# Patient Record
Sex: MALE | Race: WHITE | NOT HISPANIC OR LATINO | Employment: STUDENT | URBAN - METROPOLITAN AREA
[De-identification: names, ages, dates, MRNs, and addresses within clinical notes are randomized per-mention and may not be internally consistent; named-entity substitution may affect disease eponyms.]

---

## 2023-11-19 ENCOUNTER — HOSPITAL ENCOUNTER (EMERGENCY)
Facility: HOSPITAL | Age: 18
Discharge: HOME | End: 2023-11-20
Attending: STUDENT IN AN ORGANIZED HEALTH CARE EDUCATION/TRAINING PROGRAM
Payer: COMMERCIAL

## 2023-11-19 ENCOUNTER — APPOINTMENT (OUTPATIENT)
Dept: RADIOLOGY | Facility: HOSPITAL | Age: 18
End: 2023-11-19
Payer: COMMERCIAL

## 2023-11-19 DIAGNOSIS — R10.84 GENERALIZED ABDOMINAL PAIN: ICD-10-CM

## 2023-11-19 DIAGNOSIS — R11.2 NAUSEA AND VOMITING, UNSPECIFIED VOMITING TYPE: Primary | ICD-10-CM

## 2023-11-19 LAB
ALBUMIN SERPL BCP-MCNC: 5 G/DL (ref 3.4–5)
ALP SERPL-CCNC: 55 U/L (ref 33–120)
ALT SERPL W P-5'-P-CCNC: 60 U/L (ref 10–52)
ANION GAP SERPL CALC-SCNC: 19 MMOL/L (ref 10–20)
APPEARANCE UR: CLEAR
AST SERPL W P-5'-P-CCNC: 24 U/L (ref 9–39)
BASOPHILS # BLD AUTO: 0.04 X10*3/UL (ref 0–0.1)
BASOPHILS NFR BLD AUTO: 0.4 %
BILIRUB SERPL-MCNC: 1.2 MG/DL (ref 0–1.2)
BILIRUB UR STRIP.AUTO-MCNC: NEGATIVE MG/DL
BUN SERPL-MCNC: 11 MG/DL (ref 6–23)
CALCIUM SERPL-MCNC: 10.3 MG/DL (ref 8.6–10.3)
CHLORIDE SERPL-SCNC: 102 MMOL/L (ref 98–107)
CO2 SERPL-SCNC: 20 MMOL/L (ref 21–32)
COLOR UR: YELLOW
CREAT SERPL-MCNC: 0.8 MG/DL (ref 0.5–1.3)
EOSINOPHIL # BLD AUTO: 0.03 X10*3/UL (ref 0–0.7)
EOSINOPHIL NFR BLD AUTO: 0.3 %
ERYTHROCYTE [DISTWIDTH] IN BLOOD BY AUTOMATED COUNT: 12.2 % (ref 11.5–14.5)
GFR SERPL CREATININE-BSD FRML MDRD: >90 ML/MIN/1.73M*2
GLUCOSE SERPL-MCNC: 140 MG/DL (ref 74–99)
GLUCOSE UR STRIP.AUTO-MCNC: NEGATIVE MG/DL
HCT VFR BLD AUTO: 52.4 % (ref 41–52)
HGB BLD-MCNC: 18.4 G/DL (ref 13.5–17.5)
HOLD SPECIMEN: NORMAL
IMM GRANULOCYTES # BLD AUTO: 0.05 X10*3/UL (ref 0–0.7)
IMM GRANULOCYTES NFR BLD AUTO: 0.5 % (ref 0–0.9)
KETONES UR STRIP.AUTO-MCNC: ABNORMAL MG/DL
LEUKOCYTE ESTERASE UR QL STRIP.AUTO: NEGATIVE
LIPASE SERPL-CCNC: 16 U/L (ref 9–82)
LYMPHOCYTES # BLD AUTO: 1.48 X10*3/UL (ref 1.2–4.8)
LYMPHOCYTES NFR BLD AUTO: 14.8 %
MCH RBC QN AUTO: 28.4 PG (ref 26–34)
MCHC RBC AUTO-ENTMCNC: 35.1 G/DL (ref 32–36)
MCV RBC AUTO: 81 FL (ref 80–100)
MONOCYTES # BLD AUTO: 0.5 X10*3/UL (ref 0.1–1)
MONOCYTES NFR BLD AUTO: 5 %
MUCOUS THREADS #/AREA URNS AUTO: NORMAL /LPF
NEUTROPHILS # BLD AUTO: 7.87 X10*3/UL (ref 1.2–7.7)
NEUTROPHILS NFR BLD AUTO: 79 %
NITRITE UR QL STRIP.AUTO: NEGATIVE
NRBC BLD-RTO: 0 /100 WBCS (ref 0–0)
PH UR STRIP.AUTO: 7 [PH]
PLATELET # BLD AUTO: 293 X10*3/UL (ref 150–450)
POTASSIUM SERPL-SCNC: 3.8 MMOL/L (ref 3.5–5.3)
PROT SERPL-MCNC: 7.6 G/DL (ref 6.4–8.2)
PROT UR STRIP.AUTO-MCNC: ABNORMAL MG/DL
RBC # BLD AUTO: 6.47 X10*6/UL (ref 4.5–5.9)
RBC # UR STRIP.AUTO: NEGATIVE /UL
RBC #/AREA URNS AUTO: NORMAL /HPF
SODIUM SERPL-SCNC: 137 MMOL/L (ref 136–145)
SP GR UR STRIP.AUTO: 1.02
UROBILINOGEN UR STRIP.AUTO-MCNC: 2 MG/DL
WBC # BLD AUTO: 10 X10*3/UL (ref 4.4–11.3)
WBC #/AREA URNS AUTO: NORMAL /HPF

## 2023-11-19 PROCEDURE — 2500000004 HC RX 250 GENERAL PHARMACY W/ HCPCS (ALT 636 FOR OP/ED): Performed by: PHYSICIAN ASSISTANT

## 2023-11-19 PROCEDURE — 96361 HYDRATE IV INFUSION ADD-ON: CPT

## 2023-11-19 PROCEDURE — 2500000001 HC RX 250 WO HCPCS SELF ADMINISTERED DRUGS (ALT 637 FOR MEDICARE OP): Performed by: PHYSICIAN ASSISTANT

## 2023-11-19 PROCEDURE — 99284 EMERGENCY DEPT VISIT MOD MDM: CPT | Mod: 25

## 2023-11-19 PROCEDURE — 80053 COMPREHEN METABOLIC PANEL: CPT | Performed by: PHYSICIAN ASSISTANT

## 2023-11-19 PROCEDURE — 36415 COLL VENOUS BLD VENIPUNCTURE: CPT | Performed by: PHYSICIAN ASSISTANT

## 2023-11-19 PROCEDURE — 74177 CT ABD & PELVIS W/CONTRAST: CPT | Mod: FR

## 2023-11-19 PROCEDURE — 96375 TX/PRO/DX INJ NEW DRUG ADDON: CPT | Mod: 59

## 2023-11-19 PROCEDURE — 2550000001 HC RX 255 CONTRASTS: Performed by: PHYSICIAN ASSISTANT

## 2023-11-19 PROCEDURE — 85025 COMPLETE CBC W/AUTO DIFF WBC: CPT | Performed by: PHYSICIAN ASSISTANT

## 2023-11-19 PROCEDURE — 96365 THER/PROPH/DIAG IV INF INIT: CPT | Mod: 59

## 2023-11-19 PROCEDURE — 96376 TX/PRO/DX INJ SAME DRUG ADON: CPT | Mod: 59

## 2023-11-19 PROCEDURE — 74177 CT ABD & PELVIS W/CONTRAST: CPT | Mod: FOREIGN READ | Performed by: RADIOLOGY

## 2023-11-19 PROCEDURE — 81001 URINALYSIS AUTO W/SCOPE: CPT | Performed by: PHYSICIAN ASSISTANT

## 2023-11-19 PROCEDURE — 99285 EMERGENCY DEPT VISIT HI MDM: CPT | Mod: 25 | Performed by: STUDENT IN AN ORGANIZED HEALTH CARE EDUCATION/TRAINING PROGRAM

## 2023-11-19 PROCEDURE — 83690 ASSAY OF LIPASE: CPT | Performed by: PHYSICIAN ASSISTANT

## 2023-11-19 RX ORDER — ONDANSETRON HYDROCHLORIDE 2 MG/ML
4 INJECTION, SOLUTION INTRAVENOUS ONCE
Status: COMPLETED | OUTPATIENT
Start: 2023-11-19 | End: 2023-11-19

## 2023-11-19 RX ORDER — FAMOTIDINE 20 MG/1
40 TABLET, FILM COATED ORAL ONCE
Status: COMPLETED | OUTPATIENT
Start: 2023-11-19 | End: 2023-11-19

## 2023-11-19 RX ADMIN — IOHEXOL 75 ML: 350 INJECTION, SOLUTION INTRAVENOUS at 22:46

## 2023-11-19 RX ADMIN — ONDANSETRON 4 MG: 2 INJECTION INTRAMUSCULAR; INTRAVENOUS at 20:04

## 2023-11-19 RX ADMIN — ONDANSETRON 4 MG: 2 INJECTION INTRAMUSCULAR; INTRAVENOUS at 22:53

## 2023-11-19 RX ADMIN — FAMOTIDINE 40 MG: 20 TABLET ORAL at 20:52

## 2023-11-19 RX ADMIN — SODIUM CHLORIDE 1000 ML: 9 INJECTION, SOLUTION INTRAVENOUS at 22:44

## 2023-11-19 RX ADMIN — SODIUM CHLORIDE 1000 ML: 9 INJECTION, SOLUTION INTRAVENOUS at 19:59

## 2023-11-19 ASSESSMENT — PAIN SCALES - GENERAL
PAINLEVEL_OUTOF10: 0 - NO PAIN

## 2023-11-19 ASSESSMENT — COLUMBIA-SUICIDE SEVERITY RATING SCALE - C-SSRS
1. IN THE PAST MONTH, HAVE YOU WISHED YOU WERE DEAD OR WISHED YOU COULD GO TO SLEEP AND NOT WAKE UP?: NO
2. HAVE YOU ACTUALLY HAD ANY THOUGHTS OF KILLING YOURSELF?: NO
6. HAVE YOU EVER DONE ANYTHING, STARTED TO DO ANYTHING, OR PREPARED TO DO ANYTHING TO END YOUR LIFE?: NO

## 2023-11-19 ASSESSMENT — LIFESTYLE VARIABLES
EVER HAD A DRINK FIRST THING IN THE MORNING TO STEADY YOUR NERVES TO GET RID OF A HANGOVER: NO
HAVE YOU EVER FELT YOU SHOULD CUT DOWN ON YOUR DRINKING: NO
EVER FELT BAD OR GUILTY ABOUT YOUR DRINKING: NO
REASON UNABLE TO ASSESS: YES
HAVE PEOPLE ANNOYED YOU BY CRITICIZING YOUR DRINKING: NO

## 2023-11-19 ASSESSMENT — PAIN - FUNCTIONAL ASSESSMENT: PAIN_FUNCTIONAL_ASSESSMENT: 0-10

## 2023-11-20 VITALS
SYSTOLIC BLOOD PRESSURE: 142 MMHG | HEART RATE: 60 BPM | TEMPERATURE: 97 F | RESPIRATION RATE: 18 BRPM | DIASTOLIC BLOOD PRESSURE: 70 MMHG | HEIGHT: 76 IN | WEIGHT: 315 LBS | BODY MASS INDEX: 38.36 KG/M2 | OXYGEN SATURATION: 98 %

## 2023-11-20 PROCEDURE — 2500000004 HC RX 250 GENERAL PHARMACY W/ HCPCS (ALT 636 FOR OP/ED): Performed by: PHYSICIAN ASSISTANT

## 2023-11-20 RX ORDER — FAMOTIDINE 40 MG/1
40 TABLET, FILM COATED ORAL NIGHTLY PRN
Qty: 20 TABLET | Refills: 0 | Status: SHIPPED | OUTPATIENT
Start: 2023-11-20 | End: 2024-04-16

## 2023-11-20 RX ORDER — NAPROXEN 500 MG/1
500 TABLET ORAL
Qty: 30 TABLET | Refills: 0 | Status: SHIPPED | OUTPATIENT
Start: 2023-11-20 | End: 2023-12-05

## 2023-11-20 RX ORDER — ONDANSETRON 4 MG/1
4 TABLET, ORALLY DISINTEGRATING ORAL EVERY 8 HOURS PRN
Qty: 20 TABLET | Refills: 0 | Status: SHIPPED | OUTPATIENT
Start: 2023-11-20 | End: 2023-11-27

## 2023-11-20 RX ADMIN — PROMETHAZINE HYDROCHLORIDE 12.5 MG: 25 INJECTION INTRAMUSCULAR; INTRAVENOUS at 01:10

## 2023-11-20 ASSESSMENT — PAIN SCALES - GENERAL: PAINLEVEL_OUTOF10: 0 - NO PAIN

## 2023-11-20 NOTE — ED PROVIDER NOTES
HPI   Chief Complaint   Patient presents with    Nausea     Nausea , vomiting, started this afternoon.         18-year-old male presents today complaining of abdominal pain nausea and vomiting.  The patient states that he initially began experiencing nausea which proceeded into vomiting and abdominal discomfort which began this morning.  He reports a generalized abdominal discomfort.  Denies diarrhea or constipation.  No reports of fevers.  Denies any past history of abdominal surgery.  No reports of sick contacts with similar symptoms.  Denies any hematemesis.                          Kewadin Coma Scale Score: 15                  Patient History   History reviewed. No pertinent past medical history.  History reviewed. No pertinent surgical history.  No family history on file.  Social History     Tobacco Use    Smoking status: Not on file    Smokeless tobacco: Not on file   Substance Use Topics    Alcohol use: Not on file    Drug use: Not on file       Physical Exam   ED Triage Vitals   Temp Heart Rate Resp BP   11/19/23 1934 11/19/23 1934 11/19/23 1934 11/19/23 1934   35.2 °C (95.4 °F) 76 24 150/87      SpO2 Temp Source Heart Rate Source Patient Position   11/19/23 1934 11/19/23 2008 -- --   98 % Temporal        BP Location FiO2 (%)     -- --             Physical Exam  Vitals and nursing note reviewed.   Constitutional:       General: He is not in acute distress.     Appearance: Normal appearance. He is normal weight. He is not ill-appearing, toxic-appearing or diaphoretic.   HENT:      Head: Normocephalic.      Nose: Nose normal.      Mouth/Throat:      Mouth: Mucous membranes are moist.   Eyes:      Extraocular Movements: Extraocular movements intact.      Conjunctiva/sclera: Conjunctivae normal.   Cardiovascular:      Rate and Rhythm: Normal rate and regular rhythm.      Pulses: Normal pulses.   Pulmonary:      Effort: Pulmonary effort is normal. No respiratory distress.      Breath sounds: Normal breath sounds.    Abdominal:      General: Abdomen is flat. Bowel sounds are normal. There is no distension.      Palpations: Abdomen is soft.      Tenderness: There is no abdominal tenderness. There is no guarding or rebound.   Musculoskeletal:         General: Normal range of motion.      Cervical back: Normal range of motion and neck supple.   Skin:     General: Skin is warm and dry.      Capillary Refill: Capillary refill takes less than 2 seconds.   Neurological:      General: No focal deficit present.      Mental Status: He is alert and oriented to person, place, and time.   Psychiatric:         Mood and Affect: Mood normal.         Behavior: Behavior normal.         Thought Content: Thought content normal.         Judgment: Judgment normal.         ED Course & MDM   ED Course as of 11/20/23 0046   Sun Nov 19, 2023 2038 LIPASE: 16 [DS]   2038 ALT(!): 60 [DS]   2038 AST: 24 [DS]   2038 Creatinine: 0.80 [DS]   2038 POTASSIUM: 3.8 [DS]   2038 SODIUM: 137 [DS]   2039 GLUCOSE(!): 140 [DS]   2039 WBC: 10.0 [DS]   Mon Nov 20, 2023 0025 IMPRESSION:  No acute inflammatory changes in the abdomen and pelvis.   [DS]      ED Course User Index  [DS] Tien Stone PA-C         Diagnoses as of 11/20/23 0046   Nausea and vomiting, unspecified vomiting type   Generalized abdominal pain       Medical Decision Making  Patient found to be well-appearing and hemodynamically stable on arrival.  He is nontoxic in appearance.  He is afebrile.  Abdomen soft without overt tenderness.  Negative Burns sign.  Blood work was obtained revealing no evidence of leukocytosis.  Patient had a slight elevation in his ALT likely related to his body habitus.  Creatinine within normal limits.  No evidence of significant electrolyte derangement.  Lipase 16.  Patient was provided with 2 L of normal saline along with Pepcid and Zofran.  CT imaging was obtained and revealed no acute inflammatory changes in the abdomen or pelvis.  Patient family were notified.   Patient was given a p.o. fluid challenge without subsequent vomiting.  Explained differentials at length which include viral etiology.  Recommended close follow-up with his primary care physician.  Patient be discharged home with a prescription for Zofran ODT        Procedure  Procedures     Tien Stone PA-C  11/20/23 0047

## 2023-12-06 ENCOUNTER — OFFICE VISIT (OUTPATIENT)
Dept: GASTROENTEROLOGY | Facility: CLINIC | Age: 18
End: 2023-12-06
Payer: COMMERCIAL

## 2023-12-06 VITALS
OXYGEN SATURATION: 98 % | HEART RATE: 89 BPM | BODY MASS INDEX: 40.29 KG/M2 | RESPIRATION RATE: 18 BRPM | WEIGHT: 315 LBS | SYSTOLIC BLOOD PRESSURE: 123 MMHG | DIASTOLIC BLOOD PRESSURE: 82 MMHG | TEMPERATURE: 97.3 F

## 2023-12-06 DIAGNOSIS — K52.9 CHRONIC DIARRHEA: Primary | ICD-10-CM

## 2023-12-06 DIAGNOSIS — R12 HEARTBURN: ICD-10-CM

## 2023-12-06 DIAGNOSIS — K62.5 BRBPR (BRIGHT RED BLOOD PER RECTUM): ICD-10-CM

## 2023-12-06 DIAGNOSIS — R13.19 ESOPHAGEAL DYSPHAGIA: ICD-10-CM

## 2023-12-06 DIAGNOSIS — R10.9 LEFT SIDED ABDOMINAL PAIN: ICD-10-CM

## 2023-12-06 DIAGNOSIS — R14.0 ABDOMINAL BLOATING: ICD-10-CM

## 2023-12-06 DIAGNOSIS — R11.2 NAUSEA AND VOMITING, UNSPECIFIED VOMITING TYPE: ICD-10-CM

## 2023-12-06 DIAGNOSIS — R68.81 EARLY SATIETY: ICD-10-CM

## 2023-12-06 PROCEDURE — 99204 OFFICE O/P NEW MOD 45 MIN: CPT | Performed by: NURSE PRACTITIONER

## 2023-12-06 RX ORDER — HYDROXYZINE HYDROCHLORIDE 25 MG/1
TABLET, FILM COATED ORAL AS NEEDED
COMMUNITY

## 2023-12-06 RX ORDER — POLYETHYLENE GLYCOL 3350 17 G/17G
238 POWDER, FOR SOLUTION ORAL ONCE
Qty: 238 G | Refills: 0 | Status: SHIPPED | OUTPATIENT
Start: 2023-12-06 | End: 2023-12-06

## 2023-12-06 RX ORDER — SERTRALINE HYDROCHLORIDE 25 MG/1
25 TABLET, FILM COATED ORAL DAILY
COMMUNITY

## 2023-12-06 RX ORDER — TRAZODONE HYDROCHLORIDE 50 MG/1
50 TABLET ORAL NIGHTLY PRN
COMMUNITY
End: 2024-04-16

## 2023-12-06 ASSESSMENT — ENCOUNTER SYMPTOMS
WEAKNESS: 0
FLANK PAIN: 0
PHOTOPHOBIA: 0
HALLUCINATIONS: 0
ARTHRALGIAS: 0
HEADACHES: 0
WHEEZING: 0
BACK PAIN: 0
AGITATION: 0
FREQUENCY: 0
NERVOUS/ANXIOUS: 0
MYALGIAS: 0
COUGH: 0
LIGHT-HEADEDNESS: 0
DIZZINESS: 0
CHILLS: 0
FEVER: 0
EYE PAIN: 0
NUMBNESS: 0
ADENOPATHY: 0
SHORTNESS OF BREATH: 0
JOINT SWELLING: 0
DYSURIA: 0
PALPITATIONS: 0
HEMATURIA: 0
SORE THROAT: 0
DIAPHORESIS: 0
FATIGUE: 0

## 2023-12-06 NOTE — PROGRESS NOTES
"Subjective   Patient ID: Cam Andino is a 18 y.o. male who presents for multiple digestive issues     This is a 18 year old WM with history of nicotine/marijuana use, morbid obesity, anxiety/depression, and GERD who is presenting to the GI clinic for an initial visit.     History per pt and review of EMR     Reports for the past year he's been having nausea/vomiting 5 days a week on average.     On 11/19/23 he was seen in the [facility] ED what he describes as 2 days' worth of nausea/vomiting and abdominal pain. CT A/P w/ contrast at that time was without any acute findings. He was advised GI follow up.     Reports dealing with heartburn for the past 3 years. Tried omeprazole last year which did not help (only some transient relief with famotidine).     Reports getting  full \"very quickly\" when eating. Cannot finish a serving at dinner. Only eating 1.5 meals per day.     ROS positive for esophageal dysphagia to pills.     Reports for the past year he's been having intermittent watery diarrhea which occurs when he wakes up in the morning. For the past week he's been having diarrhea 4-5 times a day. Denies nocturnal diarrhea. Some days his stools are formed.     Reports for the past week he's been having pain ~2 inches to the left of his umbliculis which improves with defecation. The abdominal pain has been improving.     Reports on Thanksgiving 2023 he was experiencing BRBPR. He saw a moderate amount of blood in the toilet bowl water and on the toilet paper when wiping. He felt rectal burning at that time.     ROS positive for significant abdominal bloating.     Denies unintentional weight loss, constipation, hematemesis or melena.     He is not on any special diets. His diet includes gluten.       Past medical history:   See above    Past surgical history:  Dental extractions when he was \"younger\"    Family history:   No GI cancers    Social history:   Smokes marijuana 1-2 times a month; was a daily marijuana " smoker in the past prior to being on his medications for mental health   Vapes nicotine  Denies use of alcohol   In cosmetology school         Review of Systems   Constitutional:  Negative for chills, diaphoresis, fatigue and fever.   HENT:  Negative for congestion, ear pain, hearing loss, sneezing and sore throat.    Eyes:  Negative for photophobia, pain and visual disturbance.   Respiratory:  Negative for cough, shortness of breath and wheezing.    Cardiovascular:  Negative for chest pain, palpitations and leg swelling.   Endocrine: Negative for cold intolerance and heat intolerance.   Genitourinary:  Negative for dysuria, flank pain, frequency and hematuria.   Musculoskeletal:  Negative for arthralgias, back pain, gait problem, joint swelling and myalgias.   Skin:  Negative for rash.   Neurological:  Negative for dizziness, syncope, weakness, light-headedness, numbness and headaches.   Hematological:  Negative for adenopathy.   Psychiatric/Behavioral:  Negative for agitation and hallucinations. The patient is not nervous/anxious.        /82 (BP Location: Right arm, Patient Position: Sitting, BP Cuff Size: Large adult)   Pulse 89   Temp 36.3 °C (97.3 °F) (Temporal)   Resp 18   Wt (!) 150 kg (331 lb 0.3 oz)   SpO2 98%   BMI 40.29 kg/m²      Objective     No Known Allergies    Physical Exam  Constitutional:       General: He is not in acute distress.     Appearance: Normal appearance. He is obese.   HENT:      Head: Normocephalic and atraumatic.   Eyes:      Conjunctiva/sclera: Conjunctivae normal.   Cardiovascular:      Rate and Rhythm: Normal rate and regular rhythm.      Heart sounds: No murmur heard.     No gallop.   Pulmonary:      Effort: Pulmonary effort is normal.      Breath sounds: Normal breath sounds.   Abdominal:      General: Bowel sounds are normal. There is no distension.      Tenderness: There is no abdominal tenderness. There is no guarding.   Musculoskeletal:         General: No  swelling or deformity. Normal range of motion.      Cervical back: Normal range of motion. No rigidity.   Skin:     General: Skin is warm and dry.      Coloration: Skin is not jaundiced.      Findings: No lesion or rash.   Neurological:      General: No focal deficit present.      Mental Status: He is alert and oriented to person, place, and time.   Psychiatric:         Mood and Affect: Mood normal.         Assessment/Plan   Problem List Items Addressed This Visit    None  Visit Diagnoses       Chronic diarrhea    -  Primary    Relevant Medications    polyethylene glycol (Glycolax, Miralax) 17 gram/dose powder    Other Relevant Orders    Colonoscopy Diagnostic    Abdominal bloating        Relevant Medications    polyethylene glycol (Glycolax, Miralax) 17 gram/dose powder    Other Relevant Orders    Colonoscopy Diagnostic    EGD    Esophageal dysphagia        Relevant Orders    EGD    Early satiety        Relevant Orders    EGD    BRBPR (bright red blood per rectum)        Relevant Medications    polyethylene glycol (Glycolax, Miralax) 17 gram/dose powder    Other Relevant Orders    Colonoscopy Diagnostic    Nausea and vomiting, unspecified vomiting type        Heartburn        Left sided abdominal pain               Chronic nausea/vomiting, PPI refractory heartburn, esophageal dysphagia, early satiety: question if some of this may be related to GERD, potentially with reflux esophagitis. Also consider EoE,  esophageal stricture, PUD, and upper GI malignancy with the latter less likely. Lastly, I question if habitual marijuana use may be playing a role and I discussed this with pt in detail.   - will proceed with EGD  - continue famotidine   - recommend nicotine cessation     2. Chronic intermittent diarrhea, abdominal bloating, left sided abdominal pain: etiology not entirely clear and potentially multifactorial. Consider IBD, celiac disease, and functional etiology.    - with proceed with colonoscopy with random  biopsies  - Miralax Gatorade split bowel prep for colonoscopy   - EGD as above (with duodenal biopsy for celiac disease)     3. BRBPR: consider hemorrhoids, IBD, polyps, and colorectal cancer with the latter less likely  - colonoscopy as above     4. Follow up:  - return to clinic 3 weeks after completion of EGD and colonoscopy

## 2023-12-06 NOTE — PATIENT INSTRUCTIONS
Thanks for coming to the GI clinic.     I would like you to get an upper endoscopy. Please call [phone] to schedule.     I would like you to get a colonoscopy. Please call to schedule.   Please read all of the instructions 7 days before your colonoscopy.   You will need to take ONLY clear liquids the ENTIRE day before your procedure. These include (clear fruit juices, soda, Gatorade, broth, jello and coffee/tea) Avoid red and purple drinks. No cream or milk in the coffee.   You will need to take a bowel preparation.   You will also need a .      Marijuana can cause issues with nausea/vomiting.     Try to quit vaping nicotine for your overall health.     Follow up 3 weeks after completion of the above.

## 2023-12-06 NOTE — LETTER
December 6, 2023     Patient: Cam Andino   YOB: 2005   Date of Visit: 12/6/2023       To Whom It May Concern:    Cam Andino was seen in my clinic on 12/6/2023 at 1:20 pm. Please excuse Cam for his absence from school on this day to make the appointment.    He is free to return to school without any restrictions.     If you have any questions or concerns, please don't hesitate to call.         Sincerely,         Tonio Rebolledo, APRN-CNP

## 2023-12-07 ENCOUNTER — OFFICE VISIT (OUTPATIENT)
Dept: PRIMARY CARE | Facility: CLINIC | Age: 18
End: 2023-12-07
Payer: COMMERCIAL

## 2023-12-07 ENCOUNTER — LAB (OUTPATIENT)
Dept: LAB | Facility: LAB | Age: 18
End: 2023-12-07
Payer: COMMERCIAL

## 2023-12-07 VITALS
HEIGHT: 76 IN | SYSTOLIC BLOOD PRESSURE: 132 MMHG | DIASTOLIC BLOOD PRESSURE: 72 MMHG | WEIGHT: 315 LBS | BODY MASS INDEX: 38.36 KG/M2

## 2023-12-07 DIAGNOSIS — F41.9 ANXIETY AND DEPRESSION: ICD-10-CM

## 2023-12-07 DIAGNOSIS — R73.09 ABNORMAL GLUCOSE: ICD-10-CM

## 2023-12-07 DIAGNOSIS — E66.09 CLASS 2 OBESITY DUE TO EXCESS CALORIES WITH BODY MASS INDEX (BMI) OF 38.0 TO 38.9 IN ADULT, UNSPECIFIED WHETHER SERIOUS COMORBIDITY PRESENT: Primary | ICD-10-CM

## 2023-12-07 DIAGNOSIS — F32.A ANXIETY AND DEPRESSION: ICD-10-CM

## 2023-12-07 DIAGNOSIS — Z00.00 HEALTH CARE MAINTENANCE: ICD-10-CM

## 2023-12-07 DIAGNOSIS — Z00.00 HEALTHCARE MAINTENANCE: ICD-10-CM

## 2023-12-07 DIAGNOSIS — F17.209 NICOTINE DEPENDENCE WITH NICOTINE-INDUCED DISORDER, UNSPECIFIED NICOTINE PRODUCT TYPE: ICD-10-CM

## 2023-12-07 LAB
ANION GAP SERPL CALC-SCNC: 14 MMOL/L (ref 10–20)
BUN SERPL-MCNC: 14 MG/DL (ref 6–23)
CALCIUM SERPL-MCNC: 10.1 MG/DL (ref 8.6–10.6)
CHLORIDE SERPL-SCNC: 102 MMOL/L (ref 98–107)
CHOLEST SERPL-MCNC: 196 MG/DL (ref 0–199)
CHOLESTEROL/HDL RATIO: 4.7
CO2 SERPL-SCNC: 28 MMOL/L (ref 21–32)
CREAT SERPL-MCNC: 0.87 MG/DL (ref 0.5–1.3)
ERYTHROCYTE [DISTWIDTH] IN BLOOD BY AUTOMATED COUNT: 12.5 % (ref 11.5–14.5)
EST. AVERAGE GLUCOSE BLD GHB EST-MCNC: 91 MG/DL
GFR SERPL CREATININE-BSD FRML MDRD: >90 ML/MIN/1.73M*2
GLUCOSE SERPL-MCNC: 102 MG/DL (ref 74–99)
HBA1C MFR BLD: 4.8 %
HCT VFR BLD AUTO: 51.4 % (ref 41–52)
HDLC SERPL-MCNC: 41.9 MG/DL
HGB BLD-MCNC: 17.2 G/DL (ref 13.5–17.5)
LDLC SERPL CALC-MCNC: 103 MG/DL
MCH RBC QN AUTO: 28.1 PG (ref 26–34)
MCHC RBC AUTO-ENTMCNC: 33.5 G/DL (ref 32–36)
MCV RBC AUTO: 84 FL (ref 80–100)
NON HDL CHOLESTEROL: 154 MG/DL (ref 0–119)
NRBC BLD-RTO: 0 /100 WBCS (ref 0–0)
PLATELET # BLD AUTO: 269 X10*3/UL (ref 150–450)
POTASSIUM SERPL-SCNC: 4.1 MMOL/L (ref 3.5–5.3)
RBC # BLD AUTO: 6.12 X10*6/UL (ref 4.5–5.9)
SODIUM SERPL-SCNC: 140 MMOL/L (ref 136–145)
TRIGL SERPL-MCNC: 254 MG/DL (ref 0–149)
TSH SERPL-ACNC: 2.54 MIU/L (ref 0.44–3.98)
VLDL: 51 MG/DL (ref 0–40)
WBC # BLD AUTO: 7.2 X10*3/UL (ref 4.4–11.3)

## 2023-12-07 PROCEDURE — 36415 COLL VENOUS BLD VENIPUNCTURE: CPT

## 2023-12-07 PROCEDURE — 99204 OFFICE O/P NEW MOD 45 MIN: CPT | Performed by: STUDENT IN AN ORGANIZED HEALTH CARE EDUCATION/TRAINING PROGRAM

## 2023-12-07 PROCEDURE — 3008F BODY MASS INDEX DOCD: CPT | Performed by: STUDENT IN AN ORGANIZED HEALTH CARE EDUCATION/TRAINING PROGRAM

## 2023-12-07 PROCEDURE — 80048 BASIC METABOLIC PNL TOTAL CA: CPT

## 2023-12-07 PROCEDURE — 85027 COMPLETE CBC AUTOMATED: CPT

## 2023-12-07 PROCEDURE — 84443 ASSAY THYROID STIM HORMONE: CPT

## 2023-12-07 PROCEDURE — 83036 HEMOGLOBIN GLYCOSYLATED A1C: CPT

## 2023-12-07 PROCEDURE — 1036F TOBACCO NON-USER: CPT | Performed by: STUDENT IN AN ORGANIZED HEALTH CARE EDUCATION/TRAINING PROGRAM

## 2023-12-07 PROCEDURE — 80061 LIPID PANEL: CPT

## 2023-12-07 NOTE — PROGRESS NOTES
"Subjective   Patient ID: Cam Andino is a 18 y.o. male who presents for Establish Care.    HPI   Initial PCP visit.    Mr. Nate Andino is a 18 year old male with h/o anxiety (sees psychiatry), severe obesity.    For about the last 3 weeks, patient says he was feeling nauseous all day long. He had a day where he was vomiting constantly and went to the ED. CT abd/pelvis was unremarkable.    Also endorses diarrhea, though this was also improving.    Since then patient saw GI. He reports that for the past couple of days, symptoms are much improved. Of note, he says that he had similar episode last year that did not last as long, went to ED at that time as well.    He is in cosmetology school. He lives in [place] with his parents, siblings and grandparent. Not sexually active. He vapes nicotine. Uses marijuana 1-2 times/month. No alcohol. Exercises intermittently.    No significant family medical history reported.  Review of Systems  12-point ROS reviewed and was negative unless otherwise noted in HPI.    Objective   /72   Ht 1.93 m (6' 4\")   Wt 144 kg (318 lb)   BMI 38.71 kg/m²     Physical Exam  GEN: conversant, NAD  HEENT: PERRL, EOMI, MMM  NECK: supple, no carotid bruits appreciated b/l  CV: S1, S2, RRR  PULM: CTAB  ABD: soft, NT, ND  NEURO: no new gross focal deficits  EXT: no sig LE edema  PSYCH: appropriate affect    Assessment/Plan     #N/V/D: unclear etiology. Symptoms are improving, seeing GI. Plan for endoscopy and colonoscopy. Advised abstinence from marijuana, though patient says he is only using this rarely. ED and GI records, labs, imaging reviewed.    #Vaping/nicotine dependence: advised abstinence from nicotine products.    #Severe obesity, class 2: discussed increased efforts at diet, exercise, weight loss.    #Anxiety and depression: on sertraline. Sees psychiatry.    #Health Maintenance: routine labs.    RTC 6-12 mo or PRN     "

## 2023-12-21 DIAGNOSIS — Z12.11 COLON CANCER SCREENING: Primary | ICD-10-CM

## 2023-12-23 RX ORDER — POLYETHYLENE GLYCOL 3350, SODIUM SULFATE ANHYDROUS, SODIUM BICARBONATE, SODIUM CHLORIDE, POTASSIUM CHLORIDE 236; 22.74; 6.74; 5.86; 2.97 G/4L; G/4L; G/4L; G/4L; G/4L
4000 POWDER, FOR SOLUTION ORAL ONCE
Qty: 4000 ML | Refills: 0 | Status: SHIPPED | OUTPATIENT
Start: 2023-12-23 | End: 2023-12-23

## 2024-02-07 ENCOUNTER — ANESTHESIA EVENT (OUTPATIENT)
Dept: GASTROENTEROLOGY | Facility: EXTERNAL LOCATION | Age: 19
End: 2024-02-07

## 2024-02-16 DIAGNOSIS — Z12.11 COLON CANCER SCREENING: Primary | ICD-10-CM

## 2024-02-16 RX ORDER — SODIUM, POTASSIUM,MAG SULFATES 17.5-3.13G
1 SOLUTION, RECONSTITUTED, ORAL ORAL 2 TIMES DAILY
Qty: 354 ML | Refills: 0 | Status: SHIPPED | OUTPATIENT
Start: 2024-02-16 | End: 2024-02-21 | Stop reason: ALTCHOICE

## 2024-02-21 ENCOUNTER — HOSPITAL ENCOUNTER (OUTPATIENT)
Dept: GASTROENTEROLOGY | Facility: EXTERNAL LOCATION | Age: 19
Discharge: HOME | End: 2024-02-21
Payer: COMMERCIAL

## 2024-02-21 ENCOUNTER — ANESTHESIA (OUTPATIENT)
Dept: GASTROENTEROLOGY | Facility: EXTERNAL LOCATION | Age: 19
End: 2024-02-21

## 2024-02-21 VITALS
DIASTOLIC BLOOD PRESSURE: 64 MMHG | HEART RATE: 52 BPM | WEIGHT: 315 LBS | SYSTOLIC BLOOD PRESSURE: 118 MMHG | OXYGEN SATURATION: 100 % | RESPIRATION RATE: 14 BRPM | BODY MASS INDEX: 38.36 KG/M2 | HEIGHT: 76 IN | TEMPERATURE: 97.9 F

## 2024-02-21 DIAGNOSIS — K52.9 CHRONIC DIARRHEA: ICD-10-CM

## 2024-02-21 DIAGNOSIS — R68.81 EARLY SATIETY: ICD-10-CM

## 2024-02-21 DIAGNOSIS — R13.19 ESOPHAGEAL DYSPHAGIA: ICD-10-CM

## 2024-02-21 DIAGNOSIS — R14.0 ABDOMINAL BLOATING: ICD-10-CM

## 2024-02-21 DIAGNOSIS — K62.5 BRBPR (BRIGHT RED BLOOD PER RECTUM): ICD-10-CM

## 2024-02-21 PROCEDURE — 45380 COLONOSCOPY AND BIOPSY: CPT | Performed by: STUDENT IN AN ORGANIZED HEALTH CARE EDUCATION/TRAINING PROGRAM

## 2024-02-21 PROCEDURE — 88305 TISSUE EXAM BY PATHOLOGIST: CPT | Performed by: PATHOLOGY

## 2024-02-21 PROCEDURE — 88305 TISSUE EXAM BY PATHOLOGIST: CPT | Mod: TC,ELYLAB | Performed by: STUDENT IN AN ORGANIZED HEALTH CARE EDUCATION/TRAINING PROGRAM

## 2024-02-21 PROCEDURE — 43239 EGD BIOPSY SINGLE/MULTIPLE: CPT | Performed by: STUDENT IN AN ORGANIZED HEALTH CARE EDUCATION/TRAINING PROGRAM

## 2024-02-21 RX ORDER — ONDANSETRON HYDROCHLORIDE 2 MG/ML
4 INJECTION, SOLUTION INTRAVENOUS ONCE AS NEEDED
Status: DISCONTINUED | OUTPATIENT
Start: 2024-02-21 | End: 2024-02-22 | Stop reason: HOSPADM

## 2024-02-21 RX ORDER — OMEPRAZOLE 40 MG/1
40 CAPSULE, DELAYED RELEASE ORAL DAILY
Qty: 30 CAPSULE | Refills: 11 | Status: SHIPPED | OUTPATIENT
Start: 2024-02-21 | End: 2025-02-20

## 2024-02-21 RX ORDER — LIDOCAINE HYDROCHLORIDE 20 MG/ML
INJECTION, SOLUTION INFILTRATION; PERINEURAL AS NEEDED
Status: DISCONTINUED | OUTPATIENT
Start: 2024-02-21 | End: 2024-02-21

## 2024-02-21 RX ORDER — PROPOFOL 10 MG/ML
INJECTION, EMULSION INTRAVENOUS AS NEEDED
Status: DISCONTINUED | OUTPATIENT
Start: 2024-02-21 | End: 2024-02-21

## 2024-02-21 RX ORDER — SODIUM CHLORIDE 9 MG/ML
20 INJECTION, SOLUTION INTRAVENOUS CONTINUOUS
Status: DISCONTINUED | OUTPATIENT
Start: 2024-02-21 | End: 2024-02-22 | Stop reason: HOSPADM

## 2024-02-21 RX ADMIN — PROPOFOL 20 MG: 10 INJECTION, EMULSION INTRAVENOUS at 13:28

## 2024-02-21 RX ADMIN — PROPOFOL 30 MG: 10 INJECTION, EMULSION INTRAVENOUS at 13:26

## 2024-02-21 RX ADMIN — PROPOFOL 50 MG: 10 INJECTION, EMULSION INTRAVENOUS at 13:22

## 2024-02-21 RX ADMIN — PROPOFOL 150 MG: 10 INJECTION, EMULSION INTRAVENOUS at 13:19

## 2024-02-21 RX ADMIN — PROPOFOL 20 MG: 10 INJECTION, EMULSION INTRAVENOUS at 13:44

## 2024-02-21 RX ADMIN — PROPOFOL 20 MG: 10 INJECTION, EMULSION INTRAVENOUS at 13:36

## 2024-02-21 RX ADMIN — PROPOFOL 20 MG: 10 INJECTION, EMULSION INTRAVENOUS at 13:38

## 2024-02-21 RX ADMIN — PROPOFOL 20 MG: 10 INJECTION, EMULSION INTRAVENOUS at 13:30

## 2024-02-21 RX ADMIN — PROPOFOL 20 MG: 10 INJECTION, EMULSION INTRAVENOUS at 13:40

## 2024-02-21 RX ADMIN — PROPOFOL 20 MG: 10 INJECTION, EMULSION INTRAVENOUS at 13:42

## 2024-02-21 RX ADMIN — PROPOFOL 100 MG: 10 INJECTION, EMULSION INTRAVENOUS at 13:20

## 2024-02-21 RX ADMIN — PROPOFOL 50 MG: 10 INJECTION, EMULSION INTRAVENOUS at 13:23

## 2024-02-21 RX ADMIN — SODIUM CHLORIDE: 9 INJECTION, SOLUTION INTRAVENOUS at 13:17

## 2024-02-21 RX ADMIN — PROPOFOL 20 MG: 10 INJECTION, EMULSION INTRAVENOUS at 13:48

## 2024-02-21 RX ADMIN — PROPOFOL 20 MG: 10 INJECTION, EMULSION INTRAVENOUS at 13:46

## 2024-02-21 RX ADMIN — PROPOFOL 20 MG: 10 INJECTION, EMULSION INTRAVENOUS at 13:32

## 2024-02-21 RX ADMIN — PROPOFOL 20 MG: 10 INJECTION, EMULSION INTRAVENOUS at 13:34

## 2024-02-21 RX ADMIN — PROPOFOL 50 MG: 10 INJECTION, EMULSION INTRAVENOUS at 13:24

## 2024-02-21 RX ADMIN — LIDOCAINE HYDROCHLORIDE 2 ML: 20 INJECTION, SOLUTION INFILTRATION; PERINEURAL at 13:19

## 2024-02-21 SDOH — HEALTH STABILITY: MENTAL HEALTH: CURRENT SMOKER: 1

## 2024-02-21 ASSESSMENT — PAIN SCALES - GENERAL
PAINLEVEL_OUTOF10: 0 - NO PAIN
PAIN_LEVEL: 0

## 2024-02-21 ASSESSMENT — PAIN - FUNCTIONAL ASSESSMENT
PAIN_FUNCTIONAL_ASSESSMENT: 0-10

## 2024-02-21 NOTE — DISCHARGE INSTRUCTIONS
Patient Instructions Post Procedure      The anesthetics, sedatives or narcotics which were given to you today will be acting in your body for the next 24 hours, so you might feel a little sleepy or groggy.  This feeling should slowly wear off. Carefully read and follow the instructions.     You received sedation today:  - Do not drive or operate any machinery or power tools of any kind.   - No alcoholic beverages today, not even beer or wine.  - Do not make any important decisions or sign any legal documents.  - No over the counter medications that contain alcohol or that may cause drowsiness.    While it is common to experience mild to moderate abdominal distention, gas, or belching after your procedure, if any of these symptoms occur following discharge from the GI Lab or within one week of having your procedure, call the [facility] to be advised whether a visit to your nearest Urgent Care or Emergency Department is indicated.  Take this paper with you if you go.   - If you develop an allergic reaction to the medications that were given during your procedure such as difficulty breathing, rash, hives, severe nausea, vomiting or lightheadedness.  - If you experience chest pain, shortness of breath, severe abdominal pain, fevers and chills.  -If you develop signs and symptoms of bleeding such as blood in your spit, if your stools turn black, tarry, or bloody  - If you have not urinated within 8 hours following your procedure.  - If your IV site becomes painful, red, inflamed, or looks infected.    If you received a biopsy/polypectomy/sphincterotomy the following instructions apply below:  __ Do not use Aspirin containing products, non-steroidal medications or anti-coagulants for one week following your procedure. (Examples of these types of medications are: Advil, Arthrotec, Aleve, Coumadin, Ecotrin, Heparin, Ibuprofen, Indocin, Motrin, Naprosyn, Nuprin, Plavix, Vioxx, and Voltarin, or their generic  forms.  This list is not all-inclusive.  Check with your physician or pharmacist before resuming medications.)   __ Eat a soft diet today.  Avoid foods that are poorly digested for the next 24 hours.  These foods would include: nuts, beans, lettuce, red meats, and fried foods. Start with liquids and advance your diet as tolerated, gradually work up to eating solids.   __ Do not have a Barium Study or Enema for one week.    Your physician recommends the additional following instructions:    -You have a contact number available for emergencies. The signs and symptoms of potential delayed complications were discussed with you. You may return to normal activities tomorrow.  -Resume your previous diet or other if specified.  -Continue your present medications.   -We are waiting for your pathology results, if applicable.  -The findings and recommendations have been discussed with you and/or family.  - Please see Medication Reconciliation Form for new medication/medications prescribed.     If you experience any problems or have any questions following discharge from the GI Lab, please call: [phone] from 7 am- 4:30 pm.  In the event of an emergency please go to the closest Emergency Department or call Dr. Mera [phone]

## 2024-02-21 NOTE — ANESTHESIA POSTPROCEDURE EVALUATION
Patient: Cam Andino    Procedure Summary       Date: 02/21/24 Room / Location: [facility]    Anesthesia Start: 1317 Anesthesia Stop: 1356    Procedures:       COLONOSCOPY      EGD Diagnosis:       Chronic diarrhea      Abdominal bloating      BRBPR (bright red blood per rectum)      Esophageal dysphagia      Early satiety    Scheduled Providers: Sony Mera MD; Traci Covington RN; FRED Glasgow; Daniella Velasquez MA Responsible Provider: FRED Glasgow    Anesthesia Type: MAC ASA Status: 2            Anesthesia Type: MAC    Vitals Value Taken Time   BP 98/51 02/21/24 1356   Temp .36.7 02/21/24 1356   Pulse 51 02/21/24 1356   Resp 16 02/21/24 1356   SpO2 95 02/21/24 1356       Anesthesia Post Evaluation    Patient location during evaluation: PACU  Patient participation: waiting for patient participation  Level of consciousness: responsive to physical stimuli  Pain score: 0  Pain management: adequate  Airway patency: patent  Cardiovascular status: blood pressure returned to baseline  Respiratory status: acceptable  Hydration status: acceptable  Postoperative Nausea and Vomiting: none        No notable events documented.

## 2024-02-21 NOTE — ANESTHESIA PREPROCEDURE EVALUATION
Patient: Cam Andino    Procedure Information       Anesthesia Start Date/Time: 02/21/24 1317    Scheduled providers: Sony Mera MD; Traci Covington RN; FRED Glasgow; Daniella Velasquez MA    Procedures:       COLONOSCOPY      EGD    Location: [facility]            Relevant Problems   Anesthesia (within normal limits)      Cardiovascular (within normal limits)      Endocrine (within normal limits)      GI (within normal limits)      /Renal (within normal limits)      Neuro/Psych (within normal limits)      Pulmonary (within normal limits)      GI/Hepatic (within normal limits)      Hematology (within normal limits)      Musculoskeletal (within normal limits)      Eyes, Ears, Nose, and Throat (within normal limits)      Infectious Disease (within normal limits)       Clinical information reviewed:   Tobacco  Allergies  Meds   Med Hx  Surg Hx   Fam Hx  Soc Hx        NPO Detail:  NPO/Void Status  Date of Last Liquid: 02/21/24  Time of Last Liquid: 1140  Date of Last Solid: 02/19/24  Time of Last Solid: 2300  Last Intake Type: Clear fluids         Physical Exam    Airway  Mallampati: II  TM distance: >3 FB  Neck ROM: full     Cardiovascular - normal exam     Dental - normal exam     Pulmonary - normal exam     Abdominal   (+) obese           Anesthesia Plan    History of general anesthesia?: no  History of complications of general anesthesia?: no    ASA 2     MAC     The patient is a current smoker.  Patient was not previously instructed to abstain from smoking on day of procedure.  Patient smoked on day of procedure.    intravenous induction   Anesthetic plan and risks discussed with patient.

## 2024-02-21 NOTE — H&P
"History Of Present Illness  Cam Andino is a 18 y.o. male presenting for egd and colonoscopy      Past Medical History  Past Medical History:   Diagnosis Date    Anxiety        Surgical History  History reviewed. No pertinent surgical history.     Social History  He reports that he has never smoked. He has never used smokeless tobacco. He reports that he does not currently use drugs after having used the following drugs: Marijuana. He reports that he does not drink alcohol.    Family History  Family History   Problem Relation Name Age of Onset    Diabetes Father      Diabetes Maternal Grandmother          Allergies  Patient has no known allergies.    Review of Systems   All other systems reviewed and are negative.       Physical Exam  Vitals reviewed.   HENT:      Head: Normocephalic.   Eyes:      Pupils: Pupils are equal, round, and reactive to light.   Cardiovascular:      Rate and Rhythm: Normal rate.   Pulmonary:      Effort: Pulmonary effort is normal.   Abdominal:      General: Abdomen is flat. Bowel sounds are normal.      Palpations: Abdomen is soft.   Musculoskeletal:      Cervical back: Neck supple.   Skin:     General: Skin is warm.   Neurological:      Mental Status: He is alert. Mental status is at baseline.   Psychiatric:         Behavior: Behavior normal.          Last Recorded Vitals  Blood pressure 126/78, pulse 64, temperature 36.6 °C (97.9 °F), temperature source Tympanic, resp. rate 18, height 1.93 m (6' 4\"), weight 150 kg (330 lb), SpO2 98 %.    Relevant Results             Assessment/Plan   Active Problems:  There are no active Hospital Problems.            Sony Mera MD    "

## 2024-02-28 LAB
LABORATORY COMMENT REPORT: NORMAL
PATH REPORT.FINAL DX SPEC: NORMAL
PATH REPORT.GROSS SPEC: NORMAL
PATH REPORT.TOTAL CANCER: NORMAL

## 2024-04-13 DIAGNOSIS — R11.2 NAUSEA AND VOMITING, UNSPECIFIED VOMITING TYPE: Primary | ICD-10-CM

## 2024-04-13 RX ORDER — ONDANSETRON 4 MG/1
4 TABLET, FILM COATED ORAL EVERY 8 HOURS PRN
Qty: 20 TABLET | Refills: 0 | Status: SHIPPED | OUTPATIENT
Start: 2024-04-13 | End: 2024-04-20

## 2024-04-13 NOTE — PROGRESS NOTES
Patient's mum called, saying he is having nausea and vomiting and “feeling miserable.” Seems like at the time of EGD he was prescribed omeprazole, she was not sure if he ever filled it. He is seeing GI (Tonio Rebolledo NP) in clinic 5/1/2024. She was asking for Zofran to help him until then. I prescribed Zofran 4mg q8hr PRN for 5 days.

## 2024-04-16 ENCOUNTER — OFFICE VISIT (OUTPATIENT)
Dept: GASTROENTEROLOGY | Facility: HOSPITAL | Age: 19
End: 2024-04-16
Payer: COMMERCIAL

## 2024-04-16 VITALS
BODY MASS INDEX: 37.05 KG/M2 | TEMPERATURE: 97.4 F | WEIGHT: 298 LBS | DIASTOLIC BLOOD PRESSURE: 74 MMHG | OXYGEN SATURATION: 97 % | HEIGHT: 75 IN | SYSTOLIC BLOOD PRESSURE: 106 MMHG | HEART RATE: 75 BPM

## 2024-04-16 DIAGNOSIS — R11.2 NAUSEA AND VOMITING, UNSPECIFIED VOMITING TYPE: ICD-10-CM

## 2024-04-16 DIAGNOSIS — R10.32 BILATERAL LOWER ABDOMINAL CRAMPING: ICD-10-CM

## 2024-04-16 DIAGNOSIS — K21.9 GASTROESOPHAGEAL REFLUX DISEASE, UNSPECIFIED WHETHER ESOPHAGITIS PRESENT: ICD-10-CM

## 2024-04-16 DIAGNOSIS — K44.9 HIATAL HERNIA: ICD-10-CM

## 2024-04-16 DIAGNOSIS — K59.00 CONSTIPATION, UNSPECIFIED CONSTIPATION TYPE: Primary | ICD-10-CM

## 2024-04-16 DIAGNOSIS — R10.31 BILATERAL LOWER ABDOMINAL CRAMPING: ICD-10-CM

## 2024-04-16 PROCEDURE — 3008F BODY MASS INDEX DOCD: CPT | Performed by: NURSE PRACTITIONER

## 2024-04-16 PROCEDURE — 99214 OFFICE O/P EST MOD 30 MIN: CPT | Performed by: NURSE PRACTITIONER

## 2024-04-16 PROCEDURE — 1036F TOBACCO NON-USER: CPT | Performed by: NURSE PRACTITIONER

## 2024-04-16 RX ORDER — POLYETHYLENE GLYCOL 3350 17 G/17G
17 POWDER, FOR SOLUTION ORAL 2 TIMES DAILY PRN
Qty: 510 G | Refills: 4 | Status: SHIPPED | OUTPATIENT
Start: 2024-04-16

## 2024-04-16 ASSESSMENT — ENCOUNTER SYMPTOMS
NERVOUS/ANXIOUS: 0
DYSURIA: 0
EYE PAIN: 0
COUGH: 0
FEVER: 0
WEAKNESS: 0
JOINT SWELLING: 0
WHEEZING: 0
ARTHRALGIAS: 0
ADENOPATHY: 0
PALPITATIONS: 0
DIZZINESS: 0
SORE THROAT: 0
MYALGIAS: 0
DIAPHORESIS: 0
BACK PAIN: 0
PHOTOPHOBIA: 0
FREQUENCY: 0
LIGHT-HEADEDNESS: 0
SHORTNESS OF BREATH: 0
AGITATION: 0
FLANK PAIN: 0
FATIGUE: 0
NUMBNESS: 0
HEADACHES: 0
HEMATURIA: 0
CHILLS: 0
HALLUCINATIONS: 0

## 2024-04-16 ASSESSMENT — PAIN SCALES - GENERAL: PAINLEVEL: 1

## 2024-04-16 NOTE — PROGRESS NOTES
"Subjective   Patient ID: Cam Andino \"Nate\" is a 18 y.o. male who presents for Follow-up.    This is a 18 year old WM with history of nicotine/marijuana use, morbid obesity, anxiety/depression, and GERD who is presenting to the GI clinic for follow up. Last seen in the GI clinic on 12/6/23.     History per pt and review of EMR    Interval history:     EGD 2/21/24:   · Medium hiatal hernia   · Mild erythematous mucosa; performed cold forceps biopsies   · The duodenal bulb, 1st part of the duodenum and 2nd part of the duodenum appeared normal. Performed random biopsy to rule out celiac disease.        Colonoscopy 2/21/24:   · The terminal ileum appeared normal. Performed random biopsy using biopsy forceps.   · Small hemorrhoids   · Small hypertrophied anal papillae   · Performed pancolonic forceps biopsies to rule out colitis      A. DUODENUM SECOND PART BIOPSY: SMALL INTESTINAL MUCOSA WITH GASTRIC SURFACE CELL METAPLASIA OTHERWISE NO SIGNIFICANT PATHOLOGIC FINDINGS, SEE NOTE.      Note: These findings are consistent with prior mucosal injury.      B. ANTRUM BIOPSY: GASTRIC MUCOSA WITH NO SIGNIFICANT PATHOLOGIC FINDINGS, SEE NOTE.      Note: No microorganisms are identified.      C. ESOPHAGOGASTRIC JUNCTION BIOPSY: JUNCTIONAL MUCOSA WITH CHRONIC CARDITIS, SEE NOTE.      Note: These findings are commonly associated with reflux effect and medication effect-especially nonsteroidal anti-inflammatory drugs.      No intestinal metaplasia is identified.      Pancreatic metaplasia is noted.      D. TERMINAL ILEUM BIOPSY: SMALL INTESTINAL MUCOSA WITH NO SIGNIFICANT PATHOLOGIC FINDINGS.      E. COLON  - RANDOM BIOPSY: COLONIC MUCOSA WITH NO SIGNIFICANT PATHOLOGIC FINDINGS.      F. RECTUM BIOPSY: COLONIC MUCOSA WITH NO SIGNIFICANT PATHOLOGIC FINDINGS.       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 He plans to return to school in the fall. Reports feeling better overall since I last " "saw him in clinic.     Reports stopping marijuana briefly, but he is back to using marijuana every 3 weeks on average. He believes marijuana helps his digestive symptoms.     Reports issues with bilateral lower abdominal cramping (improves with defecation) and constipation since last week. He's been moving his bowels every 2 days.  The constipation started prior to ondansetron use. Could not state what his stools like as he \"never looks\".     Reports continued issues with nausea/vomiting, but it's improved from prior. Some relief with ondansetron.     Reports GERD is controlled with omeprazole 40 mg/day (30 minute prior to breakfast) and PRN famotidine.     He's been going to the gym and he changed his diet. He's lost weight which he is pleased about.     He's been trying to eat more fruits and vegetables.     Denies dysphagia, odynophagia, hematemesis, hematochezia ,and melena.       Review of Systems   Constitutional:  Negative for chills, diaphoresis, fatigue and fever.   HENT:  Negative for congestion, ear pain, hearing loss, sneezing and sore throat.    Eyes:  Negative for photophobia, pain and visual disturbance.   Respiratory:  Negative for cough, shortness of breath and wheezing.    Cardiovascular:  Negative for chest pain, palpitations and leg swelling.   Endocrine: Negative for cold intolerance and heat intolerance.   Genitourinary:  Negative for dysuria, flank pain, frequency and hematuria.   Musculoskeletal:  Negative for arthralgias, back pain, gait problem, joint swelling and myalgias.   Skin:  Negative for rash.   Neurological:  Negative for dizziness, syncope, weakness, light-headedness, numbness and headaches.   Hematological:  Negative for adenopathy.   Psychiatric/Behavioral:  Negative for agitation and hallucinations. The patient is not nervous/anxious.        No Known Allergies    Current Outpatient Medications   Medication Sig Dispense Refill    famotidine (Pepcid) 40 mg tablet Take 1 tablet " "(40 mg) by mouth as needed at bedtime for heartburn for up to 20 days. 20 tablet 0    hydrOXYzine HCL (Atarax) 25 mg tablet Take by mouth if needed for anxiety.      omeprazole (PriLOSEC) 40 mg DR capsule Take 1 capsule (40 mg) by mouth once daily. Do not crush or chew. 30 capsule 11    ondansetron (Zofran) 4 mg tablet Take 1 tablet (4 mg) by mouth every 8 hours if needed for nausea or vomiting for up to 7 days. 20 tablet 0    sertraline (Zoloft) 25 mg tablet Take 1 tablet (25 mg) by mouth once daily.      polyethylene glycol (Glycolax, Miralax) 17 gram/dose powder Take 17 g by mouth 2 times a day as needed (constipation). 510 g 4     No current facility-administered medications for this visit.        Objective     /74   Pulse 75   Temp 36.3 °C (97.4 °F)   Ht 1.905 m (6' 3\")   Wt 135 kg (298 lb)   SpO2 97%   BMI 37.25 kg/m²     Physical Exam  Constitutional:       General: He is not in acute distress.     Appearance: He is obese.   HENT:      Head: Normocephalic and atraumatic.   Eyes:      Conjunctiva/sclera: Conjunctivae normal.   Cardiovascular:      Rate and Rhythm: Normal rate and regular rhythm.      Heart sounds: No murmur heard.     No gallop.   Pulmonary:      Effort: Pulmonary effort is normal.      Breath sounds: Normal breath sounds.   Abdominal:      General: Bowel sounds are normal. There is no distension.      Tenderness: There is no abdominal tenderness. There is no guarding.   Musculoskeletal:         General: No swelling or deformity. Normal range of motion.      Cervical back: Normal range of motion. No rigidity.   Skin:     General: Skin is warm and dry.      Coloration: Skin is not jaundiced.      Findings: No lesion or rash.   Neurological:      General: No focal deficit present.      Mental Status: He is alert and oriented to person, place, and time.   Psychiatric:         Mood and Affect: Mood normal.         Assessment/Plan   Problem List Items Addressed This Visit    None  Visit " Diagnoses       Constipation, unspecified constipation type    -  Primary    Relevant Medications    polyethylene glycol (Glycolax, Miralax) 17 gram/dose powder    Hiatal hernia        Nausea and vomiting, unspecified vomiting type        Gastroesophageal reflux disease, unspecified whether esophagitis present        Bilateral lower abdominal cramping               Constipation and bilateral lower abdominal cramping: most likely IBS especially as he previously was dealing with diarrhea.   - start Miralax 17 grams twice a day as needed     2. Chronic nausea/vomiting: improved from prior.   - continue ondansetron, but advise to use sparingly as it can cause constipation  - recommend marijuana cessation    3. GERD: controlled. I'm pleased with his weight loss as obesity certainly is a contributing factor.   - continue omeprazole 40 mg once daily (30-60 minutes prior to breakfast)  - continue famotidine 40 mg once daily as needed  - recommend nicotine cessation     4. Hiatal hernia noted on EGD: as he is responding to medical management of GERD, I would not purse any surgical evaluation     5. Follow up:  - return to clinic in 2 months per pt request

## 2024-04-16 NOTE — PATIENT INSTRUCTIONS
Thanks for coming to the GI clinic.     Start polyethylene glycol (Miralax) 17 grams twice a day as needed for constipation.     Limit use of ondansetron as it can cause constipation.     Try to stop using nicotine and marijuana.     Continue omeprazole 40 mg once daily (30-60 minutes prior to breakfast).     Continue famotidine 40 mg once daily as needed.     Follow up 2 months.

## 2024-05-01 ENCOUNTER — APPOINTMENT (OUTPATIENT)
Dept: GASTROENTEROLOGY | Facility: CLINIC | Age: 19
End: 2024-05-01
Payer: COMMERCIAL

## 2024-08-20 ENCOUNTER — APPOINTMENT (OUTPATIENT)
Dept: PRIMARY CARE | Facility: CLINIC | Age: 19
End: 2024-08-20
Payer: COMMERCIAL

## 2024-08-20 VITALS — SYSTOLIC BLOOD PRESSURE: 104 MMHG | DIASTOLIC BLOOD PRESSURE: 72 MMHG

## 2024-08-20 DIAGNOSIS — Z00.00 ENCOUNTER FOR PREVENTIVE HEALTH EXAMINATION: ICD-10-CM

## 2024-08-20 DIAGNOSIS — E66.09 CLASS 2 OBESITY DUE TO EXCESS CALORIES WITH BODY MASS INDEX (BMI) OF 38.0 TO 38.9 IN ADULT, UNSPECIFIED WHETHER SERIOUS COMORBIDITY PRESENT: ICD-10-CM

## 2024-08-20 DIAGNOSIS — F41.9 ANXIETY AND DEPRESSION: ICD-10-CM

## 2024-08-20 DIAGNOSIS — F17.209 NICOTINE DEPENDENCE WITH NICOTINE-INDUCED DISORDER, UNSPECIFIED NICOTINE PRODUCT TYPE: Primary | ICD-10-CM

## 2024-08-20 DIAGNOSIS — F32.A ANXIETY AND DEPRESSION: ICD-10-CM

## 2024-08-20 PROCEDURE — 99395 PREV VISIT EST AGE 18-39: CPT | Performed by: STUDENT IN AN ORGANIZED HEALTH CARE EDUCATION/TRAINING PROGRAM

## 2024-08-20 ASSESSMENT — PROMIS GLOBAL HEALTH SCALE
RATE_GENERAL_HEALTH: FAIR
RATE_AVERAGE_FATIGUE: MODERATE
CARRYOUT_PHYSICAL_ACTIVITIES: COMPLETELY
RATE_MENTAL_HEALTH: FAIR
RATE_PHYSICAL_HEALTH: FAIR
RATE_AVERAGE_PAIN: 2
EMOTIONAL_PROBLEMS: OFTEN
RATE_QUALITY_OF_LIFE: GOOD
CARRYOUT_SOCIAL_ACTIVITIES: GOOD
RATE_SOCIAL_SATISFACTION: VERY GOOD

## 2024-08-20 NOTE — PROGRESS NOTES
Subjective   Patient ID: Nate Andino is a 18 y.o. male who presents for Annual Exam.    HPI   Yearly physical.    He continues to have intermittent abdominal nausea and cramping. It used to occur almost daily, but now occurs about 3 days/week. He is taking PPI daily, seems to help. He does not know of any food triggers.    Exercising about 2 days/week. Trying to work on healthy diet.    He is planning to restart cosmetology school.    He is still vaping, interested in trying to quit, wants to discuss pharmacological options.  Review of Systems  12-point ROS reviewed and was negative unless otherwise noted in HPI.    Objective   /72     Physical Exam  GEN: conversant, NAD  HEENT: PERRL, EOMI, MMM  NECK: supple, no carotid bruits appreciated b/l  CV: S1, S2, RRR  PULM: CTAB  ABD: soft, NT, ND, obese  NEURO: no new gross focal deficits  EXT: no sig LE edema  PSYCH: appropriate affect    Assessment/Plan     #N/V/D: unclear etiology. Symptoms are improving, seeing GI. Taking PPI. Had endoscopy.     #Vaping/nicotine dependence: advised abstinence from nicotine products. Discussed possible trial of buporpion SR and SE profile. He wants to check with insurance and will let us know if he would like to start this.     #Severe obesity, class 2: discussed increased efforts at diet, exercise, weight loss.     #Anxiety and depression: on sertraline. Sees psychiatry.     #Health Maintenance: Labs reviewed. Advised age-appropriate vaccines.     RTC 6-12 mo or PRN

## 2024-09-09 ENCOUNTER — OFFICE VISIT (OUTPATIENT)
Dept: GASTROENTEROLOGY | Facility: CLINIC | Age: 19
End: 2024-09-09
Payer: COMMERCIAL

## 2024-09-09 VITALS
HEIGHT: 76 IN | WEIGHT: 285 LBS | HEART RATE: 53 BPM | BODY MASS INDEX: 34.7 KG/M2 | DIASTOLIC BLOOD PRESSURE: 66 MMHG | TEMPERATURE: 97.5 F | SYSTOLIC BLOOD PRESSURE: 103 MMHG

## 2024-09-09 DIAGNOSIS — K58.1 IRRITABLE BOWEL SYNDROME WITH CONSTIPATION: ICD-10-CM

## 2024-09-09 DIAGNOSIS — R11.2 NAUSEA AND VOMITING, UNSPECIFIED VOMITING TYPE: Primary | ICD-10-CM

## 2024-09-09 DIAGNOSIS — K44.9 GASTROESOPHAGEAL REFLUX DISEASE WITH HIATAL HERNIA: ICD-10-CM

## 2024-09-09 DIAGNOSIS — K21.9 GASTROESOPHAGEAL REFLUX DISEASE WITH HIATAL HERNIA: ICD-10-CM

## 2024-09-09 PROCEDURE — 3008F BODY MASS INDEX DOCD: CPT | Performed by: NURSE PRACTITIONER

## 2024-09-09 PROCEDURE — 99213 OFFICE O/P EST LOW 20 MIN: CPT | Performed by: NURSE PRACTITIONER

## 2024-09-09 ASSESSMENT — ENCOUNTER SYMPTOMS
BACK PAIN: 0
EYE PAIN: 0
HEMATURIA: 0
FREQUENCY: 0
HALLUCINATIONS: 0
CHILLS: 0
FLANK PAIN: 0
ARTHRALGIAS: 0
PALPITATIONS: 0
AGITATION: 0
FEVER: 0
ADENOPATHY: 0
DIAPHORESIS: 0
DYSURIA: 0
WEAKNESS: 0
WHEEZING: 0
NUMBNESS: 0
DIZZINESS: 0
HEADACHES: 0
COUGH: 0
SHORTNESS OF BREATH: 0
JOINT SWELLING: 0
PHOTOPHOBIA: 0
MYALGIAS: 0
LIGHT-HEADEDNESS: 0
FATIGUE: 0
SORE THROAT: 0
NERVOUS/ANXIOUS: 0

## 2024-09-09 NOTE — PATIENT INSTRUCTIONS
Thanks for coming to the GI clinic.     Continue polyethylene glycol (Miralax) 17 grams twice a day as needed for constipation.     Continue omeprazole 40 mg once daily (to be taken 30-60 minutes prior to breakfast).      Try to stop using nicotine and marijuana.      Follow up in 6-12 months.

## 2024-09-09 NOTE — PROGRESS NOTES
"Subjective   Patient ID: Cam Andino \"Nate\" is a 19 y.o. male who presents for follow up.     This is a 19 year old WM with history of nicotine/marijuana use, morbid obesity, anxiety/depression, and GERD who is presenting to the GI clinic for follow up. Last seen in the GI clinic on 4/16/24.     History per pt and review of EMR     Reports continued issues with nausea/vomiting 3-4 days a week (1-2 episodes on those days).     Reports continued heartburn. He takes omeprazole 40 mg  once daily (15 minutes prior to breakfast).     He's lost weight due to eating healthier. Not eating at restaurants as much as prior. He's pleased with his weight loss.      Reports abdominal cramping and constipation which is controlled with Miralax. The cramping improves with defecation.     Denies dysphagia, hematemesis, hematochezia, and melena.     Smokes marijuana most days of the week  as otherwise he \"can't keep the food down\". He's working on marijuana cessation.     He vapes nicotine, but is working on quitting with his psychiatrist.     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     Eats chocolate and peppermint.     Despite the above, he reports feeling better since his last visit.     Review of Systems   Constitutional:  Negative for chills, diaphoresis, fatigue and fever.   HENT:  Negative for congestion, ear pain, hearing loss, sneezing and sore throat.    Eyes:  Negative for photophobia, pain and visual disturbance.   Respiratory:  Negative for cough, shortness of breath and wheezing.    Cardiovascular:  Negative for chest pain, palpitations and leg swelling.   Endocrine: Negative for cold intolerance and heat intolerance.   Genitourinary:  Negative for dysuria, flank pain, frequency and hematuria.   Musculoskeletal:  Negative for arthralgias, back pain, gait problem, joint swelling and myalgias.   Skin:  Negative for rash.   Neurological:  Negative for dizziness, syncope, weakness, light-headedness, numbness " "and headaches.   Hematological:  Negative for adenopathy.   Psychiatric/Behavioral:  Negative for agitation and hallucinations. The patient is not nervous/anxious.        No Known Allergies    Current Outpatient Medications   Medication Sig Dispense Refill    hydrOXYzine HCL (Atarax) 25 mg tablet Take by mouth if needed for anxiety.      omeprazole (PriLOSEC) 40 mg DR capsule Take 1 capsule (40 mg) by mouth once daily. Do not crush or chew. 30 capsule 11    polyethylene glycol (Glycolax, Miralax) 17 gram/dose powder Take 17 g by mouth 2 times a day as needed (constipation). 510 g 4    sertraline (Zoloft) 25 mg tablet Take 1 tablet (25 mg) by mouth once daily.       No current facility-administered medications for this visit.        Objective     /66   Pulse 53   Temp 36.4 °C (97.5 °F)   Ht 1.93 m (6' 4\")   Wt 129 kg (285 lb)   BMI 34.69 kg/m²     Physical Exam  Constitutional:       General: He is not in acute distress.     Appearance: Normal appearance.   HENT:      Head: Normocephalic and atraumatic.   Eyes:      Conjunctiva/sclera: Conjunctivae normal.   Cardiovascular:      Rate and Rhythm: Normal rate and regular rhythm.      Heart sounds: No murmur heard.     No gallop.   Pulmonary:      Effort: Pulmonary effort is normal.      Breath sounds: Normal breath sounds.   Abdominal:      General: Bowel sounds are normal. There is no distension.      Tenderness: There is no abdominal tenderness. There is no guarding.   Musculoskeletal:         General: No swelling or deformity. Normal range of motion.      Cervical back: Normal range of motion. No rigidity.   Skin:     General: Skin is warm and dry.      Coloration: Skin is not jaundiced.      Findings: No lesion or rash.   Neurological:      General: No focal deficit present.      Mental Status: He is alert and oriented to person, place, and time.   Psychiatric:         Mood and Affect: Mood normal.         Assessment/Plan   Problem List Items Addressed " This Visit    None  Visit Diagnoses       Nausea and vomiting, unspecified vomiting type    -  Primary    Gastroesophageal reflux disease with hiatal hernia        Irritable bowel syndrome with constipation               IBS-C: controlled.   - continue Miralax 17 grams twice a day as needed      2. Chronic nausea/vomiting: most likely cannabinoid hyperemesis syndrome. Also consider GERD-related, but less likely.   - again recommend marijuana cessation     3. GERD with hiatal hernia: under fair control. He is not taking his PPI properly in terms of timing.   - continue omeprazole 40 mg once daily (advise to take 30-60 minutes prior to breakfast)  - discuss GERD dietary precautions; reading materials provided   - again recommend nicotine cessation      4. Follow up:  - return to clinic in 6-12 months     *discussed with pt the importance of compliance with appointments going forward as he no showed for 2 visits with me since his last clinic visit

## 2024-12-06 DIAGNOSIS — R13.19 ESOPHAGEAL DYSPHAGIA: ICD-10-CM

## 2024-12-06 RX ORDER — OMEPRAZOLE 40 MG/1
40 CAPSULE, DELAYED RELEASE ORAL DAILY
Qty: 90 CAPSULE | Refills: 3 | Status: SHIPPED | OUTPATIENT
Start: 2024-12-06 | End: 2025-12-06